# Patient Record
Sex: MALE | Race: WHITE
[De-identification: names, ages, dates, MRNs, and addresses within clinical notes are randomized per-mention and may not be internally consistent; named-entity substitution may affect disease eponyms.]

---

## 2020-01-01 ENCOUNTER — TRANSCRIBE ORDERS (OUTPATIENT)
Dept: OTHER | Age: 50
End: 2020-01-01

## 2020-01-01 ENCOUNTER — HEALTH MAINTENANCE LETTER (OUTPATIENT)
Age: 50
End: 2020-01-01

## 2020-01-01 ENCOUNTER — PRE VISIT (OUTPATIENT)
Dept: ONCOLOGY | Facility: CLINIC | Age: 50
End: 2020-01-01

## 2020-01-01 ENCOUNTER — ONCOLOGY VISIT (OUTPATIENT)
Dept: ONCOLOGY | Facility: CLINIC | Age: 50
End: 2020-01-01
Attending: INTERNAL MEDICINE
Payer: COMMERCIAL

## 2020-01-01 VITALS
BODY MASS INDEX: 29.57 KG/M2 | HEIGHT: 74 IN | WEIGHT: 230.4 LBS | TEMPERATURE: 97.8 F | SYSTOLIC BLOOD PRESSURE: 120 MMHG | RESPIRATION RATE: 16 BRPM | HEART RATE: 100 BPM | DIASTOLIC BLOOD PRESSURE: 75 MMHG | OXYGEN SATURATION: 99 %

## 2020-01-01 DIAGNOSIS — C19: Primary | ICD-10-CM

## 2020-01-01 DIAGNOSIS — C78.6 PERITONEAL CARCINOMATOSIS (H): ICD-10-CM

## 2020-01-01 DIAGNOSIS — R10.9 ABDOMINAL PAIN, UNSPECIFIED ABDOMINAL LOCATION: ICD-10-CM

## 2020-01-01 LAB
COPATH REPORT: NORMAL
COPATH REPORT: NORMAL

## 2020-01-01 PROCEDURE — G0463 HOSPITAL OUTPT CLINIC VISIT: HCPCS | Mod: ZF

## 2020-01-01 PROCEDURE — 00000346 ZZHCL STATISTIC REVIEW OUTSIDE SLIDES TC 88321: Performed by: SURGERY

## 2020-01-01 RX ORDER — CITALOPRAM HYDROBROMIDE 40 MG/1
40 TABLET ORAL DAILY
COMMUNITY
Start: 2019-01-01

## 2020-01-01 RX ORDER — ACETAMINOPHEN 325 MG/1
650 TABLET ORAL PRN
COMMUNITY
Start: 2019-01-01

## 2020-01-01 RX ORDER — LORAZEPAM 0.5 MG/1
.5-1 TABLET ORAL PRN
COMMUNITY
Start: 2020-01-01

## 2020-01-01 RX ORDER — TRAMADOL HYDROCHLORIDE 50 MG/1
1 TABLET ORAL EVERY 4 HOURS PRN
COMMUNITY
Start: 2019-01-01

## 2020-01-01 RX ORDER — PROCHLORPERAZINE MALEATE 10 MG
10 TABLET ORAL PRN
COMMUNITY
Start: 2020-01-01

## 2020-01-01 RX ORDER — IBUPROFEN 200 MG
600 TABLET ORAL PRN
COMMUNITY
Start: 2019-01-01

## 2020-01-01 RX ORDER — ONDANSETRON 8 MG/1
8 TABLET, FILM COATED ORAL EVERY 8 HOURS PRN
COMMUNITY
Start: 2020-01-01

## 2020-01-01 ASSESSMENT — ENCOUNTER SYMPTOMS
WEIGHT GAIN: 0
ALTERED TEMPERATURE REGULATION: 1
HALLUCINATIONS: 0
BOWEL INCONTINENCE: 0
DEPRESSION: 1
BLOATING: 1
BLOOD IN STOOL: 0
CHILLS: 0
FATIGUE: 1
VOMITING: 1
POLYPHAGIA: 0
HEARTBURN: 1
RECTAL PAIN: 1
DECREASED CONCENTRATION: 1
DECREASED APPETITE: 1
NAUSEA: 1
FEVER: 0
INSOMNIA: 1
INCREASED ENERGY: 1
NERVOUS/ANXIOUS: 1
PANIC: 1
JAUNDICE: 0
ABDOMINAL PAIN: 1
CONSTIPATION: 1
NIGHT SWEATS: 1
WEIGHT LOSS: 1
DIARRHEA: 1
POLYDIPSIA: 0

## 2020-01-01 ASSESSMENT — MIFFLIN-ST. JEOR: SCORE: 1971.9

## 2020-01-24 NOTE — TELEPHONE ENCOUNTER
ONCOLOGY INTAKE: Records Information      APPT INFORMATION:  Referring provider:  Dr. 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 Yes    Has patient been seen for any external appt for this diagnosis? Yes    If yes, where? [facility]      Has patient had any imaging or procedures outside of [place] for this condition? Yes      If Yes, where? [facility]      ADDITIONAL INFORMATION:  Patient is on the wait list.

## 2020-01-27 NOTE — TELEPHONE ENCOUNTER
Action    Action Taken January 27, 2020  Call to PT and verified all records are at [facility]    -1/30/20: Images from  resolved, and now viewable to PACS  8:29 AM     RECORDS STATUS - ALL OTHER DIAGNOSIS      RECORDS RECEIVED FROM: [facility]   DATE RECEIVED: Care Everywhere   NOTES STATUS DETAILS   OFFICE NOTE from referring provider     OFFICE NOTE from medical oncologist     DISCHARGE SUMMARY from hospital     DISCHARGE REPORT from the ER     OPERATIVE REPORT     MEDICATION LIST     CLINICAL TRIAL TREATMENTS TO DATE     LABS     PATHOLOGY REPORTS     ANYTHING RELATED TO DIAGNOSIS  [facility]  Case: [id number]   Case: [id number]    GENONOMIC TESTING     TYPE:     IMAGING (NEED IMAGES & REPORT)     CT SCANS Requested 1/27/20 [facility]   MRI     MAMMO     ULTRASOUND Requested 1/27/20 [facility]   PET

## 2020-02-21 PROBLEM — C18.9 COLON CANCER (H): Status: ACTIVE | Noted: 2020-01-01

## 2020-02-24 PROBLEM — C78.6 PERITONEAL CARCINOMATOSIS (H): Status: ACTIVE | Noted: 2020-01-01

## 2020-02-24 NOTE — PROGRESS NOTES
Leland Gallo is a 49-year-old man I was asked to see at the request of Dr. Burt Angeles for evaluation of peritoneal metastasis from a right colon cancer.  He developed obstructive symptoms in spring of 2018.  He was found to have a hepatic flexure colon cancer.  On 04/11, he underwent a right hemicolectomy.  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  In May he started FOLFOX treatment.  He completed this in November.  He began having abdominal pain and distention in January and had a CT scan which demonstrated peritoneal nodules.  I reviewed his most recent CT scan on 01/20, which demonstrated increased retrocaval lymphadenopathy, nodularity of the omentum, ascites and peritoneal nodules.  He has required several paracenteses.  He has recently started irinotecan-based chemotherapy.  He has not received Avastin yet.  One of his paracentesis was sent for cytology, which was consistent with signet ring carcinoma.  In the waiting room today, he had a lot of trouble with nausea and vomiting.  This has now resolved.  He has lost about 20 pounds over the last month.  He states he has been having normal bowel movements.      PAST MEDICAL HISTORY:  Prior to his colon cancer was unremarkable.      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      PLAN:  I talked to him about the treatment options.  I told him that the goal of cytoreductive surgery is to remove all visible peritoneal nodules.  I talked to him about the role of HIPEC and how recent studies have not shown an improvement in outcomes.  I told him that with signet ring carcinoma, the outcomes with cytoreductive surgery are not very good.  I did not recommend cytoreductive surgery at this time.  I think he would need to have a very good response to chemotherapy which would include a long-term stability.  Nevertheless, the results with surgery for signet ring  carcinomatosis are poor.  I told him and his wife that.  He is going to continue his treatment under Dr. Angeles's care.  If there is a dramatic response and he would like to see me, I will be glad to talk to him again.      TT:  30 minutes.  CT:  30 minutes.      cc:   Burt Angeles MD   [facility], [address]nd Floor   [address] [place] [address]

## 2020-02-24 NOTE — NURSING NOTE
"Oncology Rooming Note    February 24, 2020 4:04 PM   Leland Gallo is a 49 year old male who presents for:    Chief Complaint   Patient presents with     New Patient     NEW PT; SIGNET RING CELL CARCINOMA; VITALS TAKEN      Initial Vitals: /75   Pulse 100   Temp 97.8  F (36.6  C) (Oral)   Resp 16   Ht 1.867 m (6' 1.5\")   Wt 104.5 kg (230 lb 6.4 oz)   SpO2 99%   BMI 29.99 kg/m   Estimated body mass index is 29.99 kg/m  as calculated from the following:    Height as of this encounter: 1.867 m (6' 1.5\").    Weight as of this encounter: 104.5 kg (230 lb 6.4 oz). Body surface area is 2.33 meters squared.  Data Unavailable Comment: Data Unavailable   No LMP for male patient.  Allergies reviewed: Yes  Medications reviewed: Yes    Medications: Medication refills not needed today.  Pharmacy name entered into EPIC: Data Unavailable    Clinical concerns: Patient is actively throwing up while rooming patient.  Dr Angulo  was notified.      Ban Reina              "

## 2020-12-06 ENCOUNTER — HEALTH MAINTENANCE LETTER (OUTPATIENT)
Age: 50
End: 2020-12-06

## 2021-04-11 ENCOUNTER — HEALTH MAINTENANCE LETTER (OUTPATIENT)
Age: 51
End: 2021-04-11

## 2021-09-25 ENCOUNTER — HEALTH MAINTENANCE LETTER (OUTPATIENT)
Age: 51
End: 2021-09-25